# Patient Record
Sex: MALE | Employment: PART TIME | URBAN - METROPOLITAN AREA
[De-identification: names, ages, dates, MRNs, and addresses within clinical notes are randomized per-mention and may not be internally consistent; named-entity substitution may affect disease eponyms.]

---

## 2017-03-22 ENCOUNTER — HOSPITAL ENCOUNTER (EMERGENCY)
Age: 27
Discharge: HOME | End: 2017-03-22
Attending: FAMILY MEDICINE

## 2017-03-22 VITALS
OXYGEN SATURATION: 98 % | BODY MASS INDEX: 35.49 KG/M2 | RESPIRATION RATE: 18 BRPM | SYSTOLIC BLOOD PRESSURE: 135 MMHG | TEMPERATURE: 97.3 F | HEIGHT: 72 IN | HEART RATE: 80 BPM | WEIGHT: 262 LBS | DIASTOLIC BLOOD PRESSURE: 75 MMHG

## 2017-03-22 DIAGNOSIS — R21 RASH AND OTHER NONSPECIFIC SKIN ERUPTION: Primary | ICD-10-CM

## 2017-03-22 RX ORDER — HYDROXYZINE 50 MG/1
50 TABLET, FILM COATED ORAL
Qty: 20 TAB | Refills: 0 | Status: SHIPPED | OUTPATIENT
Start: 2017-03-22 | End: 2017-04-01

## 2017-03-22 RX ORDER — PERMETHRIN 50 MG/G
CREAM TOPICAL
Qty: 60 G | Refills: 0 | Status: SHIPPED | OUTPATIENT
Start: 2017-03-22 | End: 2017-04-21

## 2017-03-22 RX ORDER — PREDNISONE 5 MG/1
60 TABLET ORAL
Status: DISCONTINUED | OUTPATIENT
Start: 2017-03-22 | End: 2017-03-22 | Stop reason: HOSPADM

## 2017-03-22 RX ORDER — PREDNISONE 20 MG/1
TABLET ORAL
Qty: 18 TAB | Refills: 0 | Status: SHIPPED | OUTPATIENT
Start: 2017-03-22

## 2017-03-22 NOTE — DISCHARGE INSTRUCTIONS

## 2017-08-09 NOTE — [facility] PROVIDER NOTE
08/09/17 1600   Group 4   Start Time 1500   Stop Time 1545   Length (min) 45 min   Group Name Activity   Attendance Not present      HPI Comments: Does not recall any new medications, lotions, or foods. No respiratory distress. Patient is a 27 y.o. male presenting with rash. The history is provided by the patient. No  was used. Rash    This is a new problem. The current episode started 3 to 5 hours ago. The problem has been gradually worsening. The problem is associated with nothing. There has been no fever. The fever has been present for less than 1 day. Affected Location: Generalized itchy rash. The pain is at a severity of 0/10. The patient is experiencing no pain. History reviewed. No pertinent past medical history. Past Surgical History:   Procedure Laterality Date    HX TONSILLECTOMY           History reviewed. No pertinent family history. Social History     Social History    Marital status: SINGLE     Spouse name: N/A    Number of children: N/A    Years of education: N/A     Occupational History    Not on file. Social History Main Topics    Smoking status: Current Every Day Smoker    Smokeless tobacco: Not on file    Alcohol use Not on file    Drug use: Not on file    Sexual activity: Not on file     Other Topics Concern    Not on file     Social History Narrative    No narrative on file                ALLERGIES: Review of patient's allergies indicates no known allergies. Review of Systems   Constitutional: Negative. HENT: Negative. Eyes: Negative. Respiratory: Negative. Cardiovascular: Negative. Gastrointestinal: Negative. Endocrine: Negative. Genitourinary: Negative. Musculoskeletal: Negative. Skin: Positive for rash. Allergic/Immunologic: Negative. Neurological: Negative. Hematological: Negative. Psychiatric/Behavioral: Negative.         Vitals:    03/22/17 1228   BP: 135/75   Pulse: 80   Resp: 18   Temp: 97.3 °F (36.3 °C)   SpO2: 98%   Weight: 118.8 kg (262 lb)   Height: 6' (1.829 m)       Physical Exam   Constitutional: He is oriented to person, place, and time. He appears well-developed and well-nourished. HENT:   Head: Normocephalic and atraumatic. Right Ear: External ear normal.   Left Ear: External ear normal.   Nose: Nose normal.   Mouth/Throat: Oropharynx is clear and moist.   Eyes: Conjunctivae and EOM are normal. Pupils are equal, round, and reactive to light. Neck: Normal range of motion. Neck supple. Cardiovascular: Normal rate, regular rhythm and normal heart sounds. Pulmonary/Chest: Effort normal and breath sounds normal.   Musculoskeletal: Normal range of motion. Neurological: He is alert and oriented to person, place, and time. Skin: Skin is warm and dry. Diffuse papular erythematous rash generalized  Linear in some areas     Psychiatric: He has a normal mood and affect. His behavior is normal. Judgment and thought content normal.   Nursing note and vitals reviewed. MDM     Differential Diagnosis; Clinical Impression; Plan:     CLINICAL IMPRESSION:  Rash and other nonspecific skin eruption  (primary encounter diagnosis)    Plan:  1. Nonspecific rash. 2. Take Prednisone/Hydroxyzine as directed  3. Follow up with PCP as needed  Amount and/or Complexity of Data Reviewed:   Clinical lab tests:  Ordered and reviewed  Risk of Significant Complications, Morbidity, and/or Mortality:   Presenting problems: Moderate  Diagnostic procedures:   Moderate  Progress:   Patient progress:  Stable      Procedures